# Patient Record
Sex: FEMALE | Race: WHITE | NOT HISPANIC OR LATINO | URBAN - METROPOLITAN AREA
[De-identification: names, ages, dates, MRNs, and addresses within clinical notes are randomized per-mention and may not be internally consistent; named-entity substitution may affect disease eponyms.]

---

## 2021-01-24 ENCOUNTER — WEB ENCOUNTER (OUTPATIENT)
Dept: URBAN - METROPOLITAN AREA CLINIC 35 | Facility: CLINIC | Age: 46
End: 2021-01-24

## 2021-01-26 ENCOUNTER — OFFICE VISIT (OUTPATIENT)
Dept: URBAN - METROPOLITAN AREA CLINIC 33 | Facility: CLINIC | Age: 46
End: 2021-01-26

## 2021-01-26 NOTE — HPI-MIGRATED HPI
;     Abdominal Pain :                 45 year old patient presents for abdominal pain consult. Patient admits she has been experiencing symptoms since ____. Patient describes symptoms as ____. Patient admits symptoms are more present during /after ____. Patient admits /denies nausea or vomiting. Patient admits /denies having any recent imaging.  Patient admits/denies EGD in the past ;

## 2021-02-07 ENCOUNTER — WEB ENCOUNTER (OUTPATIENT)
Dept: URBAN - METROPOLITAN AREA CLINIC 35 | Facility: CLINIC | Age: 46
End: 2021-02-07

## 2021-02-08 ENCOUNTER — TELEPHONE ENCOUNTER (OUTPATIENT)
Dept: URBAN - METROPOLITAN AREA CLINIC 35 | Facility: CLINIC | Age: 46
End: 2021-02-08

## 2021-02-10 ENCOUNTER — OFFICE VISIT (OUTPATIENT)
Dept: URBAN - METROPOLITAN AREA CLINIC 35 | Facility: CLINIC | Age: 46
End: 2021-02-10

## 2021-02-10 VITALS
OXYGEN SATURATION: 97 % | SYSTOLIC BLOOD PRESSURE: 118 MMHG | DIASTOLIC BLOOD PRESSURE: 80 MMHG | TEMPERATURE: 98.2 F | HEIGHT: 64 IN | HEART RATE: 93 BPM | BODY MASS INDEX: 39.27 KG/M2 | WEIGHT: 230 LBS

## 2021-02-10 RX ORDER — IBUPROFEN 800 MG/1
1 TABLET WITH FOOD OR MILK AS NEEDED TABLET ORAL THREE TIMES A DAY
Status: ACTIVE | COMMUNITY

## 2021-02-10 RX ORDER — LEVORPHANOL TARTRATE 2 MG/1
1 TABLET AS NEEDED TABLET ORAL
Status: ACTIVE | COMMUNITY

## 2021-02-10 RX ORDER — POTASSIUM CHLORIDE 10 MEQ/1
1 TABLET WITH FOOD TABLET, FILM COATED, EXTENDED RELEASE ORAL TWICE A DAY
Qty: 60 | Status: ACTIVE | COMMUNITY

## 2021-02-10 RX ORDER — UPADACITINIB 15 MG/1
1 TABLET TABLET, EXTENDED RELEASE ORAL ONCE A DAY
Qty: 30 | Status: ACTIVE | COMMUNITY

## 2021-02-10 RX ORDER — SEMAGLUTIDE 1.34 MG/ML
AS DIRECTED INJECTION, SOLUTION SUBCUTANEOUS
Status: ACTIVE | COMMUNITY

## 2021-02-10 RX ORDER — LAMOTRIGINE 100 MG/1
1 TABLET TABLET ORAL ONCE A DAY
Qty: 30 | Status: ACTIVE | COMMUNITY

## 2021-02-10 RX ORDER — ALBUTEROL SULFATE 90 UG/1
1 PUFF AS NEEDED AEROSOL, METERED RESPIRATORY (INHALATION)
Status: ACTIVE | COMMUNITY

## 2021-02-10 RX ORDER — HYDROMORPHONE HYDROCHLORIDE 8 MG/1
AS DIRECTED TABLET ORAL
Status: ACTIVE | COMMUNITY

## 2021-02-10 NOTE — HPI-MIGRATED HPI
;     Abdominal Pain :                 45 year old patient presents for abdominal pain consult. Patient admits she has been experiencing symptoms since November 2020. Patient describes symptoms as a sharp stabbing pain in her RUQ. Admits after she eats the pain intensifies. Admits Dysphagia with anything solid that causes her pain in her abdomen to worsen.  Patient admits nausea or vomiting without blood, Bloating/gas, early satiety, decreased appetite. Currently admits constipation. She has 2 BM's a week. Stools are hard with strain. Denies blood, mucus or melena. Admits she will take stool softeners daily and a laxative if she is still not able to have a bowel movement.     Patient had labs completeed on 01/07/2021 with all in normal range.  Patient admits  having a CT scan of the abdomen/pelvis on 01/20/2021 with results noted below.  Impression; no acute obstruction or inflammatory process identified.    Patient denies EGD/colonoscopy in the past. ;

## 2021-02-10 NOTE — EXAM-MIGRATED EXAMINATIONS
General Examination : Medical assistant was in room;     GENERAL APPEARANCE: - alert, in no acute distress, well developed, well nourished;   HEAD: - normocephalic, atraumatic;   ORAL CAVITY: - mucosa moist;   HEART: - no murmurs, regular rate and rhythm, S1, S2 normal;   LUNGS: - clear to auscultation bilaterally, good air movement, no wheezes, rales, rhonchi;   ABDOMEN: - bowel sounds present, no masses palpable, no organomegaly , no rebound tenderness, soft, nontender, nondistended;

## 2021-02-15 ENCOUNTER — WEB ENCOUNTER (OUTPATIENT)
Dept: URBAN - METROPOLITAN AREA CLINIC 33 | Facility: CLINIC | Age: 46
End: 2021-02-15

## 2021-02-23 ENCOUNTER — OFFICE VISIT (OUTPATIENT)
Dept: URBAN - METROPOLITAN AREA CLINIC 33 | Facility: CLINIC | Age: 46
End: 2021-02-23

## 2021-03-01 ENCOUNTER — DASHBOARD ENCOUNTERS (OUTPATIENT)
Age: 46
End: 2021-03-01

## 2021-03-01 ENCOUNTER — OFFICE VISIT (OUTPATIENT)
Dept: URBAN - METROPOLITAN AREA CLINIC 33 | Facility: CLINIC | Age: 46
End: 2021-03-01

## 2021-03-01 VITALS
HEART RATE: 67 BPM | DIASTOLIC BLOOD PRESSURE: 70 MMHG | WEIGHT: 229 LBS | OXYGEN SATURATION: 98 % | BODY MASS INDEX: 39.09 KG/M2 | SYSTOLIC BLOOD PRESSURE: 120 MMHG | HEIGHT: 64 IN

## 2021-03-01 PROBLEM — 16932000 NAUSEA AND VOMITING: Status: ACTIVE | Noted: 2021-03-01

## 2021-03-01 PROBLEM — 301717006 RIGHT UPPER QUADRANT PAIN: Status: ACTIVE | Noted: 2021-02-10

## 2021-03-01 RX ORDER — IBUPROFEN 800 MG/1
1 TABLET WITH FOOD OR MILK AS NEEDED TABLET ORAL THREE TIMES A DAY
Status: ACTIVE | COMMUNITY

## 2021-03-01 RX ORDER — POTASSIUM CHLORIDE 10 MEQ/1
1 TABLET WITH FOOD TABLET, FILM COATED, EXTENDED RELEASE ORAL TWICE A DAY
Qty: 60 | Status: ACTIVE | COMMUNITY

## 2021-03-01 RX ORDER — BACLOFEN 10 MG/1
TABLET ORAL THREE TIMES A DAY
Status: ACTIVE | COMMUNITY

## 2021-03-01 RX ORDER — LAMOTRIGINE 100 MG/1
1 TABLET TABLET ORAL ONCE A DAY
Qty: 30 | Status: ACTIVE | COMMUNITY

## 2021-03-01 RX ORDER — SEMAGLUTIDE 1.34 MG/ML
AS DIRECTED INJECTION, SOLUTION SUBCUTANEOUS
Status: ACTIVE | COMMUNITY

## 2021-03-01 RX ORDER — ALBUTEROL SULFATE 90 UG/1
1 PUFF AS NEEDED AEROSOL, METERED RESPIRATORY (INHALATION)
Status: ACTIVE | COMMUNITY

## 2021-03-01 RX ORDER — UPADACITINIB 15 MG/1
1 TABLET TABLET, EXTENDED RELEASE ORAL ONCE A DAY
Qty: 30 | Status: ACTIVE | COMMUNITY

## 2021-03-01 RX ORDER — LEVORPHANOL TARTRATE 2 MG/1
1 TABLET AS NEEDED TABLET ORAL
Status: ACTIVE | COMMUNITY

## 2021-03-01 RX ORDER — HYDROMORPHONE HYDROCHLORIDE 8 MG/1
AS DIRECTED TABLET ORAL
Status: ACTIVE | COMMUNITY

## 2021-03-01 NOTE — HPI-MIGRATED HPI
;     Abdominal Pain : Patient presents today for a follow up of abdominal pain and to review her U/S results. She admits continued abdominal pain since her last visit.   Patient admits to Nausea, bloating, onset was on  12/01/2020 and continues. Admits to some relief with Zofran. Patient admits that she was in a lot of pain yesterday when her abdominal distension. She describes it as hardc as rock.   Currently reports 1 BMs every two days with strain. Her stools are hard and round without blood, mucus, or rectal pain.  Her U/S was completed on 2/25/2021 with normal results.   Last visit (2/10/2021) 45 year old patient presents for abdominal pain consult. Patient admits she has been experiencing symptoms since November 2020. Patient describes symptoms as a sharp stabbing pain in her RUQ. Admits after she eats the pain intensifies. Admits Dysphagia with anything solid that causes her pain in her abdomen to worsen.  Patient admits nausea or vomiting without blood, Bloating/gas, early satiety, decreased appetite. Currently admits constipation. She has 2 BM's a week. Stools are hard with strain. Denies blood, mucus or melena. Admits she will take stool softeners daily and a laxative if she is still not able to have a bowel movement.     Patient had labs completeed on 01/07/2021 with all in normal range.  Patient admits  having a CT scan of the abdomen/pelvis on 01/20/2021 with results noted below.  Impression; no acute obstruction or inflammatory process identified.    Patient denies EGD/colonoscopy in the past. ;

## 2021-03-03 ENCOUNTER — TELEPHONE ENCOUNTER (OUTPATIENT)
Dept: URBAN - METROPOLITAN AREA CLINIC 35 | Facility: CLINIC | Age: 46
End: 2021-03-03

## 2021-03-04 ENCOUNTER — TELEPHONE ENCOUNTER (OUTPATIENT)
Dept: URBAN - METROPOLITAN AREA CLINIC 35 | Facility: CLINIC | Age: 46
End: 2021-03-04

## 2021-03-10 ENCOUNTER — TELEPHONE ENCOUNTER (OUTPATIENT)
Dept: URBAN - METROPOLITAN AREA CLINIC 35 | Facility: CLINIC | Age: 46
End: 2021-03-10